# Patient Record
Sex: FEMALE | Race: BLACK OR AFRICAN AMERICAN | NOT HISPANIC OR LATINO | URBAN - METROPOLITAN AREA
[De-identification: names, ages, dates, MRNs, and addresses within clinical notes are randomized per-mention and may not be internally consistent; named-entity substitution may affect disease eponyms.]

---

## 2024-02-19 ENCOUNTER — EMERGENCY (EMERGENCY)
Facility: HOSPITAL | Age: 49
LOS: 0 days | Discharge: HOME | End: 2024-02-19
Attending: EMERGENCY MEDICINE
Payer: COMMERCIAL

## 2024-02-19 VITALS
SYSTOLIC BLOOD PRESSURE: 132 MMHG | DIASTOLIC BLOOD PRESSURE: 80 MMHG | OXYGEN SATURATION: 100 % | HEART RATE: 76 BPM | RESPIRATION RATE: 18 BRPM

## 2024-02-19 VITALS
HEIGHT: 66 IN | WEIGHT: 216.93 LBS | SYSTOLIC BLOOD PRESSURE: 128 MMHG | DIASTOLIC BLOOD PRESSURE: 86 MMHG | HEART RATE: 71 BPM | TEMPERATURE: 98 F | RESPIRATION RATE: 17 BRPM | OXYGEN SATURATION: 100 %

## 2024-02-19 DIAGNOSIS — M54.2 CERVICALGIA: ICD-10-CM

## 2024-02-19 DIAGNOSIS — R07.89 OTHER CHEST PAIN: ICD-10-CM

## 2024-02-19 DIAGNOSIS — R51.9 HEADACHE, UNSPECIFIED: ICD-10-CM

## 2024-02-19 DIAGNOSIS — G44.209 TENSION-TYPE HEADACHE, UNSPECIFIED, NOT INTRACTABLE: ICD-10-CM

## 2024-02-19 LAB
ALBUMIN SERPL ELPH-MCNC: 3.3 G/DL — SIGNIFICANT CHANGE UP (ref 3.3–5)
ALP SERPL-CCNC: 60 U/L — SIGNIFICANT CHANGE UP (ref 40–120)
ALT FLD-CCNC: 26 U/L — SIGNIFICANT CHANGE UP (ref 12–78)
ANION GAP SERPL CALC-SCNC: 2 MMOL/L — LOW (ref 5–17)
AST SERPL-CCNC: 30 U/L — SIGNIFICANT CHANGE UP (ref 15–37)
BASOPHILS # BLD AUTO: 0.03 K/UL — SIGNIFICANT CHANGE UP (ref 0–0.2)
BASOPHILS NFR BLD AUTO: 0.6 % — SIGNIFICANT CHANGE UP (ref 0–2)
BILIRUB SERPL-MCNC: 0.5 MG/DL — SIGNIFICANT CHANGE UP (ref 0.2–1.2)
BUN SERPL-MCNC: 14 MG/DL — SIGNIFICANT CHANGE UP (ref 7–23)
CALCIUM SERPL-MCNC: 9.2 MG/DL — SIGNIFICANT CHANGE UP (ref 8.5–10.1)
CHLORIDE SERPL-SCNC: 109 MMOL/L — HIGH (ref 96–108)
CO2 SERPL-SCNC: 30 MMOL/L — SIGNIFICANT CHANGE UP (ref 22–31)
CREAT SERPL-MCNC: 0.63 MG/DL — SIGNIFICANT CHANGE UP (ref 0.5–1.3)
EGFR: 107 ML/MIN/1.73M2 — SIGNIFICANT CHANGE UP
EOSINOPHIL # BLD AUTO: 0.07 K/UL — SIGNIFICANT CHANGE UP (ref 0–0.5)
EOSINOPHIL NFR BLD AUTO: 1.3 % — SIGNIFICANT CHANGE UP (ref 0–6)
GLUCOSE SERPL-MCNC: 90 MG/DL — SIGNIFICANT CHANGE UP (ref 70–99)
HCG SERPL-ACNC: 2 MIU/ML — SIGNIFICANT CHANGE UP
HCT VFR BLD CALC: 39.8 % — SIGNIFICANT CHANGE UP (ref 34.5–45)
HGB BLD-MCNC: 12.5 G/DL — SIGNIFICANT CHANGE UP (ref 11.5–15.5)
IMM GRANULOCYTES NFR BLD AUTO: 0.2 % — SIGNIFICANT CHANGE UP (ref 0–0.9)
LIDOCAIN IGE QN: 22 U/L — SIGNIFICANT CHANGE UP (ref 13–75)
LYMPHOCYTES # BLD AUTO: 1.44 K/UL — SIGNIFICANT CHANGE UP (ref 1–3.3)
LYMPHOCYTES # BLD AUTO: 26.7 % — SIGNIFICANT CHANGE UP (ref 13–44)
MCHC RBC-ENTMCNC: 26.9 PG — LOW (ref 27–34)
MCHC RBC-ENTMCNC: 31.4 G/DL — LOW (ref 32–36)
MCV RBC AUTO: 85.6 FL — SIGNIFICANT CHANGE UP (ref 80–100)
MONOCYTES # BLD AUTO: 0.5 K/UL — SIGNIFICANT CHANGE UP (ref 0–0.9)
MONOCYTES NFR BLD AUTO: 9.3 % — SIGNIFICANT CHANGE UP (ref 2–14)
NEUTROPHILS # BLD AUTO: 3.34 K/UL — SIGNIFICANT CHANGE UP (ref 1.8–7.4)
NEUTROPHILS NFR BLD AUTO: 61.9 % — SIGNIFICANT CHANGE UP (ref 43–77)
NRBC # BLD: 0 /100 WBCS — SIGNIFICANT CHANGE UP (ref 0–0)
PLATELET # BLD AUTO: 260 K/UL — SIGNIFICANT CHANGE UP (ref 150–400)
POTASSIUM SERPL-MCNC: 4.3 MMOL/L — SIGNIFICANT CHANGE UP (ref 3.5–5.3)
POTASSIUM SERPL-SCNC: 4.3 MMOL/L — SIGNIFICANT CHANGE UP (ref 3.5–5.3)
PROT SERPL-MCNC: 7.7 GM/DL — SIGNIFICANT CHANGE UP (ref 6–8.3)
RBC # BLD: 4.65 M/UL — SIGNIFICANT CHANGE UP (ref 3.8–5.2)
RBC # FLD: 13.7 % — SIGNIFICANT CHANGE UP (ref 10.3–14.5)
SODIUM SERPL-SCNC: 141 MMOL/L — SIGNIFICANT CHANGE UP (ref 135–145)
TROPONIN I, HIGH SENSITIVITY RESULT: 6.3 NG/L — SIGNIFICANT CHANGE UP
WBC # BLD: 5.39 K/UL — SIGNIFICANT CHANGE UP (ref 3.8–10.5)
WBC # FLD AUTO: 5.39 K/UL — SIGNIFICANT CHANGE UP (ref 3.8–10.5)

## 2024-02-19 PROCEDURE — 99285 EMERGENCY DEPT VISIT HI MDM: CPT

## 2024-02-19 PROCEDURE — 71045 X-RAY EXAM CHEST 1 VIEW: CPT | Mod: 26

## 2024-02-19 PROCEDURE — 70450 CT HEAD/BRAIN W/O DYE: CPT | Mod: 26,MA

## 2024-02-19 PROCEDURE — 72125 CT NECK SPINE W/O DYE: CPT | Mod: 26,MA

## 2024-02-19 PROCEDURE — 93010 ELECTROCARDIOGRAM REPORT: CPT

## 2024-02-19 RX ORDER — METHOCARBAMOL 500 MG/1
1 TABLET, FILM COATED ORAL
Qty: 20 | Refills: 0
Start: 2024-02-19 | End: 2024-02-23

## 2024-02-19 RX ORDER — ACETAMINOPHEN 500 MG
975 TABLET ORAL ONCE
Refills: 0 | Status: COMPLETED | OUTPATIENT
Start: 2024-02-19 | End: 2024-02-19

## 2024-02-19 RX ORDER — ACETAMINOPHEN 500 MG
2 TABLET ORAL
Qty: 40 | Refills: 0
Start: 2024-02-19 | End: 2024-02-23

## 2024-02-19 RX ORDER — METHOCARBAMOL 500 MG/1
1500 TABLET, FILM COATED ORAL ONCE
Refills: 0 | Status: COMPLETED | OUTPATIENT
Start: 2024-02-19 | End: 2024-02-19

## 2024-02-19 RX ADMIN — METHOCARBAMOL 1500 MILLIGRAM(S): 500 TABLET, FILM COATED ORAL at 19:04

## 2024-02-19 RX ADMIN — Medication 975 MILLIGRAM(S): at 19:03

## 2024-02-19 NOTE — ED ADULT NURSE NOTE - NEURO GAIT
Subjective:       Patient ID: Blaze Moncada is a 62 y.o. male.    Chief Complaint: ER follow up; Diarrhea (like water ); Emesis; and stomach cramps    HPI    Follow-up diarrhea illness. This first began about 10 days ago. He reports she had nausea, vomiting, fever and chills. Shortly after developed diarrhea. Watery stools. No blood in the stools. No melena. There has been generalized migrating abdominal cramping. No localized abdominal pain. There has been no fever in the last week this was early on. But he still has nausea and vomiting most recently this morning. This occurred after he had an egg sandwich. He has had some generalized malaise and weakness. No chest pain or shortness of breath no syncope     He has type II diabetes that has been well-controlled. He reports no hypoglycemia. Glucose has been around 140. Nothing greater than 200. He continues all of his medications. Including the Metformin.     He has not been on a specific diarrhea diet. He was given been told. He ran out. He doesn't really know this helped or not.    Active Ambulatory Problems     Diagnosis Date Noted    Senile nuclear sclerosis 01/05/2010    Tricuspid valve disorders, specified as nonrheumatic 04/20/2010    Pterygium of right eye 05/10/2011    Type 2 diabetes mellitus     Hyperlipidemia     Hyperlipidemia LDL goal < 100 07/09/2014    Diabetes mellitus with neurological manifestation 01/09/2015    Varicose veins of both lower extremities 01/09/2015    Inguinal hernia 04/20/2015    Type 2 diabetes mellitus, controlled 09/11/2015     Resolved Ambulatory Problems     Diagnosis Date Noted    Hemorrhage of gastrointestinal tract, unspecified 10/19/2010     Past Medical History:   Diagnosis Date    Cataract     Diabetes mellitus     Erectile dysfunction     Hyperlipidemia     Pterygium of right eye     Varicose veins          Review of Systems   Constitutional: Positive for activity change and appetite change. Negative for  unexpected weight change.   HENT: Negative for trouble swallowing.    Respiratory: Negative for shortness of breath and wheezing.    Cardiovascular: Negative for chest pain and leg swelling.   Gastrointestinal: Positive for diarrhea, nausea and vomiting. Negative for abdominal distention, abdominal pain, blood in stool and constipation.   Genitourinary: Negative for dysuria and hematuria.   Skin: Negative for rash.   Neurological: Positive for weakness. Negative for dizziness and headaches.       Objective:      Physical Exam   Constitutional: He is oriented to person, place, and time. He appears well-developed and well-nourished. No distress.   HENT:   Head: Normocephalic and atraumatic.   Right Ear: External ear normal.   Left Ear: External ear normal.   Nose: Nose normal.   Mouth/Throat: Oropharynx is clear and moist. No oropharyngeal exudate.   Eyes: Conjunctivae and EOM are normal. Pupils are equal, round, and reactive to light. Right eye exhibits no discharge. Left eye exhibits no discharge. No scleral icterus.   Neck: Normal range of motion. Neck supple.   Cardiovascular: Normal rate, regular rhythm and normal heart sounds.  Exam reveals no gallop.    No murmur heard.  Pulmonary/Chest: Effort normal. No respiratory distress.   Abdominal: Soft. Bowel sounds are normal. He exhibits no distension and no mass. There is no tenderness. There is no rebound and no guarding. No hernia.   Neurological: He is alert and oriented to person, place, and time. He has normal reflexes.   Skin: Skin is warm and dry. He is not diaphoretic.   Psychiatric: He has a normal mood and affect. His behavior is normal.   Vitals reviewed.     I did review the emergency room documentation, labs.  Assessment:       1. Diarrhea, unspecified type    2. Controlled type 2 diabetes mellitus without complication, without long-term current use of insulin        Plan:       Blaze was seen today for er follow up, diarrhea, emesis and stomach  cramps.    Diagnoses and all orders for this visit:    Diarrhea, unspecified type  -     CULTURE, STOOL; Future  -     CLOSTRIDIUM DIFFICILE; Future  -     Stool Exam-Ova,Cysts,Parasites; Future   He reports no improvement after 10 days and still having some mild nausea and vomiting. I did instruct on a bland diarrhea diet. Adequate fluid intake. He does not appear to be significantly dehydrated so we can continue to treat this as an outpatient but if anything worsens then he must go to the emergency room.   Lomotil should be used sparingly just to help relieve symptoms some to make this more manageable.    Controlled type 2 diabetes mellitus without complication, without long-term current use of insulin   Stable, I'm going to discontinue the Metformin Temple rarely until his diarrhea resolves. He should monitor glucose regularly at home    Other orders  -     diphenoxylate-atropine 2.5-0.025 mg (LOMOTIL) 2.5-0.025 mg per tablet; Take 1 tablet by mouth 4 (four) times daily as needed for Diarrhea.     will update me later this week        steady

## 2024-02-19 NOTE — ED PROVIDER NOTE - DISCHARGE DATE
[de-identified] : Twyla was last seen: 8/24/23 [de-identified] : Reason for visit: CLL  Since last visit: Reports she has been feeling well since she was last seen in clinic, energy and appetite are good.   Twyla does not spontaneously report: fever, chills, sweats, weight loss, skin rashes, petechiae, bruising, eye irritation, hearing loss, mouth sores, sore throat, cough, hemoptysis, pleuritic chest pain, change in vision, focal numbness/weakness, chest pains, palpitations, nausea, vomiting, diarrhea, constipation, dysuria, hematuria, bowel or bladder incontinence, sever joint pain, back pain or gait disturbance. No history of PML.  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    Medications: obintuzumab (23 - ) Venetoclax (8/3/23 - ) allopurinol 300mg daily Vit D enalapril 20mg bid HCTZ 50mg daily K 10meq daily Ventolin PRN  NKDA  Examination: Twyla is articulate and in no acute distress ECO Skin: without rashes Hearing: not decreased Posterior pharynx: WNL No occiput nor poster cervical adenopathy resolution of bilateral submandibular nodes 1x2;  resolution of bilateral sublingual nodes < 1cm x 1cm resolution of submental nodule. No supraclavicular Resolution of left 1 x 2 axillary node resolution of right axillary adenopathy.  No spinal, paraspinal, nor CVA tenderness Lungs: Clear Cardiac: without rubs, II/VI systolic murmur at LSB without sig radiation.  Abd: soft and non-tender, previous spleen no longer appreciated. No hepatic enlargement.  No inguinal nor femoral adenopathy resolution pretibial edema Gait: unencumbered   CBC 23: WBC 6.5, Hgb  7.7, PLT    36,000; ANC 3.2 23: WBC 3.8, Hgb  9.5, PLT  106,000; ANC 2.7 23: WBC 3.19, Hgb 9.1, PLT 89,000 23: WBC 4.00, Hgb 9.6, PLT 82,000 23: WBC 3.23, Hgb 9.9, ,000 19-Feb-2024

## 2024-02-19 NOTE — ED PROVIDER NOTE - CARE PROVIDER_API CALL
Ayaan Jason  Internal Medicine  [address] [place] [address]  Phone: [phone]  Fax: [phone]  Follow Up Time: Urgent

## 2024-02-19 NOTE — ED PROVIDER NOTE - PHYSICAL EXAMINATION
Vitals: WNL  Gen: AAOx3, NAD, sitting comfortably in stretcher, calm, non-toxic, responsive to questions   Head: ncat, perrla, eomi b/l  Neck: supple, no lymphadenopathy, no midline deviation  Heart: rrr, no m/r/g  Lungs: CTA b/l, no rales/ronchi/wheezes  Abd: soft, nontender, non-distended, no rebound or guarding  Ext: no clubbing/cyanosis/edema  Neuro: sensation and muscle strength intact b/l, steady gait, no focal weakness or sensory loss
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
## 2024-02-19 NOTE — ED ADULT NURSE REASSESSMENT NOTE - NS ED NURSE REASSESS COMMENT FT1
Covering for primary RN. Pt AOx3 with steady gait. Pt reports no acute distress at this time. Pt accepts the d.c from the MD.

## 2024-02-19 NOTE — ED PROVIDER NOTE - PATIENT PORTAL LINK FT
You can access the FollowMyHealth Patient Portal offered by [facility] by registering at the following website: [email or web address]. By joining [facility]’s FollowMyHealth portal, you will also be able to view your health information using other applications (apps) compatible with our system.

## 2024-02-19 NOTE — ED PROVIDER NOTE - PROGRESS NOTE DETAILS
Results reported to patient--grossly benign, xr clear, labs unremarkable, CT shows no acute pathology   Pt. reports feeling better after meds  pt. agrees to f/u with primary care outpt. asap  pt. understands to return to ED if symptoms worsen; will d/c with meds for musculoskeletal pain Concerning neurological signs that would warrant return to ED were discussed with patient.  Pt. understands to return if severe headache, numbness, weakness, nausea, vomiting, or personality changes develop.  Pt. verbalizes understanding.
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
## 2024-06-02 NOTE — ED ADULT TRIAGE NOTE - NS ED NURSE AMBULANCES
[facility]
This was a shared visit with the OCHOA. I reviewed and verified the documentation.

## 2024-12-23 ENCOUNTER — APPOINTMENT (OUTPATIENT)
Dept: CARDIOLOGY | Facility: CLINIC | Age: 49
End: 2024-12-23

## 2024-12-23 PROBLEM — Z00.00 ENCOUNTER FOR PREVENTIVE HEALTH EXAMINATION: Status: ACTIVE | Noted: 2024-12-23

## 2025-02-15 NOTE — ED ADULT NURSE NOTE - NSSEPSISSUSPECTED_ED_A_ED
All discharge papers and meds reviewed cartside with patient. No further questions or concerns at this time. VSS. IV removed. Aox4. Ambulatory with a steady gait to ED carroll   No